# Patient Record
Sex: FEMALE | Race: WHITE
[De-identification: names, ages, dates, MRNs, and addresses within clinical notes are randomized per-mention and may not be internally consistent; named-entity substitution may affect disease eponyms.]

---

## 2020-06-12 ENCOUNTER — HOSPITAL ENCOUNTER (EMERGENCY)
Dept: HOSPITAL 87 - ER | Age: 60
Discharge: HOME | End: 2020-06-12
Payer: MEDICAID

## 2020-06-12 VITALS — SYSTOLIC BLOOD PRESSURE: 121 MMHG | DIASTOLIC BLOOD PRESSURE: 71 MMHG

## 2020-06-12 VITALS — HEIGHT: 64 IN | WEIGHT: 169.76 LBS | BODY MASS INDEX: 28.98 KG/M2

## 2020-06-12 DIAGNOSIS — I10: ICD-10-CM

## 2020-06-12 DIAGNOSIS — R11.2: Primary | ICD-10-CM

## 2020-06-12 DIAGNOSIS — Z79.899: ICD-10-CM

## 2020-06-12 LAB
BASOPHILS NFR BLD AUTO: 0.2 % (ref 0–2)
CHLORIDE SERPL-SCNC: 109 MEQ/L (ref 98–107)
EOSINOPHIL NFR BLD AUTO: 0 % (ref 0–5)
ERYTHROCYTE [DISTWIDTH] IN BLOOD BY AUTOMATED COUNT: 20.8 % (ref 11.6–14.6)
HCT VFR BLD AUTO: 44.4 % (ref 36–48)
HGB BLD-MCNC: 14.4 G/DL (ref 12–16)
LYMPHOCYTES NFR BLD AUTO: 14.3 % (ref 20–50)
MCH RBC QN AUTO: 27.3 PG (ref 28–32)
MCV RBC AUTO: 83.8 FL (ref 81–99)
MONOCYTES NFR BLD AUTO: 8.1 % (ref 2–8)
NEUTROPHILS NFR BLD AUTO: 77.4 % (ref 40–76)
PLATELET # BLD AUTO: 216 X1000/UL (ref 130–400)
PMV BLD AUTO: 8.6 FL (ref 7.4–10.4)
RBC # BLD AUTO: 5.3 MILL/UL (ref 4.2–5.4)

## 2020-06-12 PROCEDURE — 96374 THER/PROPH/DIAG INJ IV PUSH: CPT

## 2020-06-12 PROCEDURE — 80053 COMPREHEN METABOLIC PANEL: CPT

## 2020-06-12 PROCEDURE — 83690 ASSAY OF LIPASE: CPT

## 2020-06-12 PROCEDURE — 96361 HYDRATE IV INFUSION ADD-ON: CPT

## 2020-06-12 PROCEDURE — 85025 COMPLETE CBC W/AUTO DIFF WBC: CPT

## 2020-06-12 PROCEDURE — 99283 EMERGENCY DEPT VISIT LOW MDM: CPT

## 2020-06-12 PROCEDURE — 36415 COLL VENOUS BLD VENIPUNCTURE: CPT

## 2020-07-13 ENCOUNTER — HOSPITAL ENCOUNTER (EMERGENCY)
Dept: HOSPITAL 87 - ER | Age: 60
Discharge: LEFT BEFORE DISCHARGE | End: 2020-07-13
Payer: MEDICAID

## 2020-07-13 VITALS — BODY MASS INDEX: 27.48 KG/M2 | WEIGHT: 160.94 LBS | HEIGHT: 64 IN

## 2020-07-13 VITALS — DIASTOLIC BLOOD PRESSURE: 89 MMHG | SYSTOLIC BLOOD PRESSURE: 132 MMHG

## 2020-07-13 DIAGNOSIS — S00.81XA: ICD-10-CM

## 2020-07-13 DIAGNOSIS — F10.20: ICD-10-CM

## 2020-07-13 DIAGNOSIS — I10: ICD-10-CM
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
## 2020-07-13 DIAGNOSIS — R55: Primary | ICD-10-CM
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
## 2020-07-13 DIAGNOSIS — D64.9: ICD-10-CM
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
## 2020-07-13 DIAGNOSIS — Z91.81: ICD-10-CM

## 2020-07-13 DIAGNOSIS — E87.8: ICD-10-CM

## 2020-07-13 LAB
BASOPHILS NFR BLD AUTO: 0.6 % (ref 0–2)
CHLORIDE SERPL-SCNC: 112 MEQ/L (ref 98–107)
EOSINOPHIL NFR BLD AUTO: 1.4 % (ref 0–5)
ERYTHROCYTE [DISTWIDTH] IN BLOOD BY AUTOMATED COUNT: 20.9 % (ref 11.6–14.6)
ETHANOL SERPL-MCNC: < 10 MG/DL
HCT VFR BLD AUTO: 35.7 % (ref 36–48)
HGB BLD-MCNC: 11.6 G/DL (ref 12–16)
LYMPHOCYTES NFR BLD AUTO: 23.6 % (ref 20–50)
MCH RBC QN AUTO: 26.9 PG (ref 28–32)
MCV RBC AUTO: 82.9 FL (ref 81–99)
MONOCYTES NFR BLD AUTO: 10.9 % (ref 2–8)
NEUTROPHILS NFR BLD AUTO: 63.5 % (ref 40–76)
PLATELET # BLD AUTO: 239 X1000/UL (ref 130–400)
PMV BLD AUTO: 8.3 FL (ref 7.4–10.4)
RBC # BLD AUTO: 4.31 MILL/UL (ref 4.2–5.4)

## 2020-07-13 PROCEDURE — 83690 ASSAY OF LIPASE: CPT

## 2020-07-13 PROCEDURE — 36415 COLL VENOUS BLD VENIPUNCTURE: CPT

## 2020-07-13 PROCEDURE — 80320 DRUG SCREEN QUANTALCOHOLS: CPT

## 2020-07-13 PROCEDURE — G0480 DRUG TEST DEF 1-7 CLASSES: HCPCS

## 2020-07-13 PROCEDURE — 96360 HYDRATION IV INFUSION INIT: CPT

## 2020-07-13 PROCEDURE — 96361 HYDRATE IV INFUSION ADD-ON: CPT

## 2020-07-13 PROCEDURE — 70450 CT HEAD/BRAIN W/O DYE: CPT

## 2020-07-13 PROCEDURE — 84484 ASSAY OF TROPONIN QUANT: CPT

## 2020-07-13 PROCEDURE — 93005 ELECTROCARDIOGRAM TRACING: CPT

## 2020-07-13 PROCEDURE — 85025 COMPLETE CBC W/AUTO DIFF WBC: CPT

## 2020-07-13 PROCEDURE — 80053 COMPREHEN METABOLIC PANEL: CPT

## 2020-07-13 PROCEDURE — 99285 EMERGENCY DEPT VISIT HI MDM: CPT

## 2020-07-13 PROCEDURE — 83880 ASSAY OF NATRIURETIC PEPTIDE: CPT

## 2020-07-13 PROCEDURE — 71045 X-RAY EXAM CHEST 1 VIEW: CPT

## 2020-10-12 ENCOUNTER — HOSPITAL ENCOUNTER (EMERGENCY)
Dept: HOSPITAL 87 - ER | Age: 60
Discharge: HOME | End: 2020-10-12
Payer: MEDICAID

## 2020-10-12 VITALS — BODY MASS INDEX: 27.28 KG/M2 | HEIGHT: 66 IN | WEIGHT: 169.76 LBS

## 2020-10-12 VITALS — DIASTOLIC BLOOD PRESSURE: 82 MMHG | SYSTOLIC BLOOD PRESSURE: 127 MMHG

## 2020-10-12 DIAGNOSIS — L03.011: Primary | ICD-10-CM

## 2020-10-12 DIAGNOSIS — J45.909: ICD-10-CM

## 2020-10-12 DIAGNOSIS — Z86.14: ICD-10-CM

## 2020-10-12 DIAGNOSIS — I10: ICD-10-CM

## 2020-10-12 PROCEDURE — 99283 EMERGENCY DEPT VISIT LOW MDM: CPT

## 2020-10-12 PROCEDURE — 82962 GLUCOSE BLOOD TEST: CPT

## 2020-10-12 PROCEDURE — 10060 I&D ABSCESS SIMPLE/SINGLE: CPT

## 2020-10-12 PROCEDURE — 93005 ELECTROCARDIOGRAM TRACING: CPT

## 2022-05-26 ENCOUNTER — HOSPITAL ENCOUNTER (EMERGENCY)
Dept: HOSPITAL 87 - ER | Age: 62
Discharge: HOME | End: 2022-05-26
Payer: COMMERCIAL

## 2022-05-26 VITALS — BODY MASS INDEX: 24.34 KG/M2 | HEIGHT: 62 IN | WEIGHT: 132.28 LBS

## 2022-05-26 VITALS — SYSTOLIC BLOOD PRESSURE: 116 MMHG | DIASTOLIC BLOOD PRESSURE: 87 MMHG

## 2022-05-26 DIAGNOSIS — I10: ICD-10-CM

## 2022-05-26 DIAGNOSIS — L03.116: Primary | ICD-10-CM

## 2022-05-26 DIAGNOSIS — Z86.14: ICD-10-CM

## 2022-05-26 DIAGNOSIS — Z87.01: ICD-10-CM

## 2022-05-26 DIAGNOSIS — J40: ICD-10-CM

## 2022-05-26 LAB
BASOPHILS NFR BLD AUTO: 0.8 % (ref 0–2)
CHLORIDE SERPL-SCNC: 115 MEQ/L (ref 98–107)
EOSINOPHIL NFR BLD AUTO: 2.3 % (ref 0–5)
ERYTHROCYTE [DISTWIDTH] IN BLOOD BY AUTOMATED COUNT: 17.6 % (ref 11.6–14.6)
HCT VFR BLD AUTO: 41.3 % (ref 36–48)
HGB BLD-MCNC: 13.2 G/DL (ref 12–16)
LYMPHOCYTES NFR BLD AUTO: 31.2 % (ref 20–50)
MCH RBC QN AUTO: 31.4 PG (ref 28–32)
MCV RBC AUTO: 98.4 FL (ref 81–99)
MONOCYTES NFR BLD AUTO: 14.3 % (ref 2–8)
NEUTROPHILS NFR BLD AUTO: 51.4 % (ref 40–76)
PLATELET # BLD AUTO: 177 X1000/UL (ref 130–400)
PMV BLD AUTO: 7.8 FL (ref 7.4–10.4)
RBC # BLD AUTO: 4.19 MILL/UL (ref 4.2–5.4)

## 2022-05-26 PROCEDURE — 36415 COLL VENOUS BLD VENIPUNCTURE: CPT

## 2022-05-26 PROCEDURE — 85025 COMPLETE CBC W/AUTO DIFF WBC: CPT

## 2022-05-26 PROCEDURE — 99283 EMERGENCY DEPT VISIT LOW MDM: CPT

## 2022-05-26 PROCEDURE — 80053 COMPREHEN METABOLIC PANEL: CPT

## 2022-10-06 ENCOUNTER — HOSPITAL ENCOUNTER (EMERGENCY)
Dept: HOSPITAL 87 - ER | Age: 62
Discharge: HOME | End: 2022-10-06
Payer: COMMERCIAL

## 2022-10-06 VITALS — BODY MASS INDEX: 18.37 KG/M2 | HEIGHT: 65 IN | WEIGHT: 110.23 LBS

## 2022-10-06 VITALS — DIASTOLIC BLOOD PRESSURE: 68 MMHG | SYSTOLIC BLOOD PRESSURE: 114 MMHG
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
## 2022-10-06 DIAGNOSIS — J45.909: ICD-10-CM

## 2022-10-06 DIAGNOSIS — S80.01XA: ICD-10-CM

## 2022-10-06 DIAGNOSIS — Z71.6: ICD-10-CM

## 2022-10-06 DIAGNOSIS — Z86.14: ICD-10-CM

## 2022-10-06 DIAGNOSIS — S61.512A: ICD-10-CM

## 2022-10-06 DIAGNOSIS — S01.81XA: Primary | ICD-10-CM

## 2022-10-06 DIAGNOSIS — F17.210: ICD-10-CM
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
## 2022-10-06 DIAGNOSIS — S09.8XXA: ICD-10-CM
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
## 2022-10-06 DIAGNOSIS — Z87.01: ICD-10-CM

## 2022-10-06 DIAGNOSIS — I10: ICD-10-CM

## 2022-10-06 DIAGNOSIS — S61.511A: ICD-10-CM

## 2022-10-06 PROCEDURE — 90715 TDAP VACCINE 7 YRS/> IM: CPT

## 2022-10-06 PROCEDURE — 99284 EMERGENCY DEPT VISIT MOD MDM: CPT

## 2022-10-06 PROCEDURE — 73560 X-RAY EXAM OF KNEE 1 OR 2: CPT

## 2022-10-06 PROCEDURE — 73100 X-RAY EXAM OF WRIST: CPT

## 2022-12-06 ENCOUNTER — HOSPITAL ENCOUNTER (EMERGENCY)
Dept: HOSPITAL 87 - ER | Age: 62
LOS: 2 days | Discharge: HOME | End: 2022-12-08
Payer: COMMERCIAL

## 2022-12-06 VITALS — WEIGHT: 94.8 LBS | HEIGHT: 66 IN | BODY MASS INDEX: 15.24 KG/M2

## 2022-12-06 DIAGNOSIS — R10.9: Primary | ICD-10-CM

## 2022-12-06 LAB
APPEARANCE UR: (no result)
BASOPHILS NFR BLD AUTO: 0.5 % (ref 0–2)
CHLORIDE SERPL-SCNC: 110 MEQ/L (ref 98–107)
COLOR UR: YELLOW
EOSINOPHIL NFR BLD AUTO: 0.3 % (ref 0–5)
ERYTHROCYTE [DISTWIDTH] IN BLOOD BY AUTOMATED COUNT: 16.3 % (ref 11.6–14.6)
HCT VFR BLD AUTO: 40 % (ref 36–48)
HGB BLD-MCNC: 13.2 G/DL (ref 12–16)
HGB UR QL STRIP: NEGATIVE
INR PPP: 1.1
KETONES UR STRIP-MCNC: NEGATIVE MG/DL
LEUKOCYTE ESTERASE UR QL STRIP: NEGATIVE
LYMPHOCYTES NFR BLD AUTO: 33.9 % (ref 20–50)
MCH RBC QN AUTO: 31.2 PG (ref 28–32)
MCV RBC AUTO: 94.5 FL (ref 81–99)
MONOCYTES NFR BLD AUTO: 9.3 % (ref 2–8)
NEUTROPHILS NFR BLD AUTO: 56 % (ref 40–76)
NITRITE UR QL STRIP: NEGATIVE
PH UR STRIP: 5.5 [PH] (ref 4.5–8)
PLATELET # BLD AUTO: 250 X1000/UL (ref 130–400)
PMV BLD AUTO: 7.6 FL (ref 7.4–10.4)
PROT UR QL STRIP: (no result)
PROTHROMBIN TIME: 12.2 SEC (ref 9.6–11)
RBC # BLD AUTO: 4.23 MILL/UL (ref 4.2–5.4)
SP GR UR STRIP: 1.03 (ref 1–1.03)
UROBILINOGEN UR STRIP-MCNC: 1 E.U./DL (ref 0.2–1)

## 2022-12-06 PROCEDURE — 96374 THER/PROPH/DIAG INJ IV PUSH: CPT

## 2022-12-06 PROCEDURE — 99285 EMERGENCY DEPT VISIT HI MDM: CPT

## 2022-12-06 PROCEDURE — 36415 COLL VENOUS BLD VENIPUNCTURE: CPT

## 2022-12-06 PROCEDURE — 71045 X-RAY EXAM CHEST 1 VIEW: CPT

## 2022-12-06 PROCEDURE — 93005 ELECTROCARDIOGRAM TRACING: CPT

## 2022-12-06 PROCEDURE — 84484 ASSAY OF TROPONIN QUANT: CPT

## 2022-12-06 PROCEDURE — 85610 PROTHROMBIN TIME: CPT

## 2022-12-06 PROCEDURE — 96375 TX/PRO/DX INJ NEW DRUG ADDON: CPT

## 2022-12-06 PROCEDURE — 81003 URINALYSIS AUTO W/O SCOPE: CPT

## 2022-12-06 PROCEDURE — 85025 COMPLETE CBC W/AUTO DIFF WBC: CPT

## 2022-12-06 PROCEDURE — 96361 HYDRATE IV INFUSION ADD-ON: CPT

## 2022-12-06 PROCEDURE — 74176 CT ABD & PELVIS W/O CONTRAST: CPT

## 2022-12-06 PROCEDURE — 80053 COMPREHEN METABOLIC PANEL: CPT

## 2022-12-07 RX ADMIN — SERTRALINE HYDROCHLORIDE SCH MG: 25 TABLET ORAL at 09:00

## 2022-12-08 ENCOUNTER — HOSPITAL ENCOUNTER (EMERGENCY)
Dept: HOSPITAL 87 - ER | Age: 62
LOS: 1 days | Discharge: HOME | End: 2022-12-09
Payer: COMMERCIAL

## 2022-12-08 VITALS — HEIGHT: 66 IN | BODY MASS INDEX: 21.26 KG/M2 | WEIGHT: 132.28 LBS

## 2022-12-08 VITALS — SYSTOLIC BLOOD PRESSURE: 96 MMHG | DIASTOLIC BLOOD PRESSURE: 69 MMHG

## 2022-12-08 VITALS — SYSTOLIC BLOOD PRESSURE: 106 MMHG | DIASTOLIC BLOOD PRESSURE: 79 MMHG

## 2022-12-08 DIAGNOSIS — F17.290: ICD-10-CM

## 2022-12-08 DIAGNOSIS — J40: ICD-10-CM

## 2022-12-08 DIAGNOSIS — E05.90: ICD-10-CM

## 2022-12-08 DIAGNOSIS — R10.13: Primary | ICD-10-CM

## 2022-12-08 DIAGNOSIS — Z87.01: ICD-10-CM

## 2022-12-08 LAB
BASOPHILS NFR BLD AUTO: 0.4 % (ref 0–2)
CHLORIDE SERPL-SCNC: 106 MEQ/L (ref 98–107)
EOSINOPHIL NFR BLD AUTO: 0.6 % (ref 0–5)
ERYTHROCYTE [DISTWIDTH] IN BLOOD BY AUTOMATED COUNT: 15.8 % (ref 11.6–14.6)
HCT VFR BLD AUTO: 38.8 % (ref 36–48)
HGB BLD-MCNC: 12.7 G/DL (ref 12–16)
LYMPHOCYTES NFR BLD AUTO: 37.8 % (ref 20–50)
MCH RBC QN AUTO: 31.3 PG (ref 28–32)
MCV RBC AUTO: 95.4 FL (ref 81–99)
MONOCYTES NFR BLD AUTO: 7.5 % (ref 2–8)
NEUTROPHILS NFR BLD AUTO: 53.7 % (ref 40–76)
PLATELET # BLD AUTO: 228 X1000/UL (ref 130–400)
PMV BLD AUTO: 8 FL (ref 7.4–10.4)
RBC # BLD AUTO: 4.07 MILL/UL (ref 4.2–5.4)

## 2022-12-08 PROCEDURE — 99284 EMERGENCY DEPT VISIT MOD MDM: CPT

## 2022-12-08 PROCEDURE — 85025 COMPLETE CBC W/AUTO DIFF WBC: CPT

## 2022-12-08 PROCEDURE — 80053 COMPREHEN METABOLIC PANEL: CPT

## 2022-12-08 PROCEDURE — 74176 CT ABD & PELVIS W/O CONTRAST: CPT

## 2022-12-08 PROCEDURE — 36415 COLL VENOUS BLD VENIPUNCTURE: CPT

## 2022-12-08 RX ADMIN — SERTRALINE HYDROCHLORIDE SCH MG: 25 TABLET ORAL at 09:00

## 2022-12-09 ENCOUNTER — HOSPITAL ENCOUNTER (EMERGENCY)
Dept: HOSPITAL 87 - ER | Age: 62
Discharge: ANOTHER HOSPITAL | End: 2022-12-09
Payer: COMMERCIAL

## 2022-12-09 VITALS — DIASTOLIC BLOOD PRESSURE: 70 MMHG | SYSTOLIC BLOOD PRESSURE: 109 MMHG

## 2022-12-09 VITALS — BODY MASS INDEX: 15.94 KG/M2 | WEIGHT: 99.21 LBS | HEIGHT: 66 IN

## 2022-12-09 DIAGNOSIS — F32.9: ICD-10-CM

## 2022-12-09 DIAGNOSIS — E05.90: ICD-10-CM

## 2022-12-09 DIAGNOSIS — Z79.899: ICD-10-CM

## 2022-12-09 DIAGNOSIS — Z87.01: ICD-10-CM

## 2022-12-09 DIAGNOSIS — Z20.822: ICD-10-CM

## 2022-12-09 DIAGNOSIS — N39.0: ICD-10-CM

## 2022-12-09 DIAGNOSIS — J45.909: ICD-10-CM

## 2022-12-09 DIAGNOSIS — F17.290: ICD-10-CM

## 2022-12-09 DIAGNOSIS — R45.851: Primary | ICD-10-CM

## 2022-12-09 LAB
AMPHETAMINES UR QL SCN: NEGATIVE
APPEARANCE UR: CLEAR
BARBITURATES UR QL SCN: NEGATIVE
BASOPHILS NFR BLD AUTO: 0.6 % (ref 0–2)
BENZODIAZ UR QL SCN: NEGATIVE
BZE UR QL SCN: NEGATIVE
CANNABINOIDS UR QL SCN: NEGATIVE
CHLORIDE SERPL-SCNC: 109 MEQ/L (ref 98–107)
COLOR UR: YELLOW
EOSINOPHIL NFR BLD AUTO: 0.9 % (ref 0–5)
ERYTHROCYTE [DISTWIDTH] IN BLOOD BY AUTOMATED COUNT: 15.8 % (ref 11.6–14.6)
ETHANOL SERPL-MCNC: < 10 MG/DL
HCT VFR BLD AUTO: 40.5 % (ref 36–48)
HGB BLD-MCNC: 13.2 G/DL (ref 12–16)
HGB UR QL STRIP: (no result)
KETONES UR STRIP-MCNC: (no result) MG/DL
LEUKOCYTE ESTERASE UR QL STRIP: (no result)
LYMPHOCYTES NFR BLD AUTO: 45 % (ref 20–50)
MCH RBC QN AUTO: 31.1 PG (ref 28–32)
MCV RBC AUTO: 95.5 FL (ref 81–99)
METHADONE UR QL SCN: (no result)
MONOCYTES NFR BLD AUTO: 8.2 % (ref 2–8)
NEUTROPHILS NFR BLD AUTO: 45.3 % (ref 40–76)
NITRITE UR QL STRIP: NEGATIVE
OPIATES UR QL SCN: NEGATIVE
PCP UR QL SCN: NEGATIVE
PH UR STRIP: 6 [PH] (ref 4.5–8)
PLATELET # BLD AUTO: 219 X1000/UL (ref 130–400)
PMV BLD AUTO: 8 FL (ref 7.4–10.4)
PROT UR QL STRIP: NEGATIVE
RBC # BLD AUTO: 4.24 MILL/UL (ref 4.2–5.4)
SP GR UR STRIP: 1.02 (ref 1–1.03)
UROBILINOGEN UR STRIP-MCNC: 1 E.U./DL (ref 0.2–1)

## 2022-12-09 PROCEDURE — U0003 INFECTIOUS AGENT DETECTION BY NUCLEIC ACID (DNA OR RNA); SEVERE ACUTE RESPIRATORY SYNDROME CORONAVIRUS 2 (SARS-COV-2) (CORONAVIRUS DISEASE [COVID-19]), AMPLIFIED PROBE TECHNIQUE, MAKING USE OF HIGH THROUGHPUT TECHNOLOGIES AS DESCRIBED BY CMS-2020-01-R: HCPCS

## 2022-12-09 PROCEDURE — 80329 ANALGESICS NON-OPIOID 1 OR 2: CPT

## 2022-12-09 PROCEDURE — 80305 DRUG TEST PRSMV DIR OPT OBS: CPT

## 2022-12-09 PROCEDURE — 87426 SARSCOV CORONAVIRUS AG IA: CPT

## 2022-12-09 PROCEDURE — 85025 COMPLETE CBC W/AUTO DIFF WBC: CPT

## 2022-12-09 PROCEDURE — 80053 COMPREHEN METABOLIC PANEL: CPT

## 2022-12-09 PROCEDURE — G0480 DRUG TEST DEF 1-7 CLASSES: HCPCS

## 2022-12-09 PROCEDURE — 36415 COLL VENOUS BLD VENIPUNCTURE: CPT

## 2022-12-09 PROCEDURE — 80320 DRUG SCREEN QUANTALCOHOLS: CPT

## 2022-12-09 PROCEDURE — 99285 EMERGENCY DEPT VISIT HI MDM: CPT

## 2022-12-09 PROCEDURE — 81003 URINALYSIS AUTO W/O SCOPE: CPT

## 2022-12-09 PROCEDURE — C9803 HOPD COVID-19 SPEC COLLECT: HCPCS

## 2022-12-09 PROCEDURE — 80307 DRUG TEST PRSMV CHEM ANLYZR: CPT

## 2022-12-09 RX ADMIN — CEPHALEXIN SCH MG: 250 CAPSULE ORAL at 09:24

## 2022-12-09 RX ADMIN — CEPHALEXIN SCH MG: 250 CAPSULE ORAL at 12:46

## 2022-12-27 ENCOUNTER — HOSPITAL ENCOUNTER (EMERGENCY)
Dept: HOSPITAL 87 - ER | Age: 62
Discharge: LEFT BEFORE DISCHARGE | End: 2022-12-27
Payer: COMMERCIAL

## 2022-12-27 VITALS — BODY MASS INDEX: 22.68 KG/M2 | WEIGHT: 141.1 LBS | HEIGHT: 66 IN

## 2022-12-27 VITALS — DIASTOLIC BLOOD PRESSURE: 72 MMHG | SYSTOLIC BLOOD PRESSURE: 122 MMHG

## 2022-12-27 DIAGNOSIS — Z53.21: Primary | ICD-10-CM

## 2023-01-06 ENCOUNTER — HOSPITAL ENCOUNTER (EMERGENCY)
Dept: HOSPITAL 87 - ER | Age: 63
Discharge: HOME | End: 2023-01-06
Payer: COMMERCIAL

## 2023-01-06 VITALS — DIASTOLIC BLOOD PRESSURE: 80 MMHG | SYSTOLIC BLOOD PRESSURE: 134 MMHG

## 2023-01-06 VITALS — HEIGHT: 63 IN | WEIGHT: 130.07 LBS | BODY MASS INDEX: 23.05 KG/M2

## 2023-01-06 DIAGNOSIS — J45.909: ICD-10-CM

## 2023-01-06 DIAGNOSIS — M54.9: Primary | ICD-10-CM

## 2023-01-06 DIAGNOSIS — Z86.39: ICD-10-CM

## 2023-01-06 PROCEDURE — 99284 EMERGENCY DEPT VISIT MOD MDM: CPT

## 2023-01-06 PROCEDURE — 96372 THER/PROPH/DIAG INJ SC/IM: CPT

## 2023-01-06 PROCEDURE — 72100 X-RAY EXAM L-S SPINE 2/3 VWS: CPT

## 2023-01-06 PROCEDURE — 72070 X-RAY EXAM THORAC SPINE 2VWS: CPT

## 2023-03-03 ENCOUNTER — HOSPITAL ENCOUNTER (INPATIENT)
Dept: HOSPITAL 87 - ER | Age: 63
LOS: 2 days | Discharge: LEFT BEFORE DISCHARGE | DRG: 243 | End: 2023-03-05
Attending: INTERNAL MEDICINE | Admitting: INTERNAL MEDICINE
Payer: COMMERCIAL

## 2023-03-03 VITALS — WEIGHT: 114 LBS | HEIGHT: 66 IN | BODY MASS INDEX: 18.32 KG/M2

## 2023-03-03 DIAGNOSIS — E03.9: ICD-10-CM

## 2023-03-03 DIAGNOSIS — M19.90: ICD-10-CM

## 2023-03-03 DIAGNOSIS — C44.90: ICD-10-CM

## 2023-03-03 DIAGNOSIS — G43.909: ICD-10-CM

## 2023-03-03 DIAGNOSIS — E05.90: ICD-10-CM

## 2023-03-03 DIAGNOSIS — F32.A: ICD-10-CM

## 2023-03-03 DIAGNOSIS — F17.210: ICD-10-CM

## 2023-03-03 DIAGNOSIS — Z82.49: ICD-10-CM

## 2023-03-03 DIAGNOSIS — R07.89: ICD-10-CM

## 2023-03-03 DIAGNOSIS — K21.9: Primary | ICD-10-CM

## 2023-03-03 DIAGNOSIS — F41.0: ICD-10-CM

## 2023-03-03 DIAGNOSIS — Z53.29: ICD-10-CM

## 2023-03-03 DIAGNOSIS — J44.9: ICD-10-CM

## 2023-03-03 DIAGNOSIS — Z79.899: ICD-10-CM

## 2023-03-03 LAB
BASOPHILS NFR BLD AUTO: 0.6 % (ref 0–2)
CHLORIDE SERPL-SCNC: 110 MEQ/L (ref 98–107)
EOSINOPHIL NFR BLD AUTO: 0.9 % (ref 0–5)
ERYTHROCYTE [DISTWIDTH] IN BLOOD BY AUTOMATED COUNT: 18.1 % (ref 11.6–14.6)
HCT VFR BLD AUTO: 39.5 % (ref 36–48)
HGB BLD-MCNC: 12.6 G/DL (ref 12–16)
LYMPHOCYTES NFR BLD AUTO: 28.5 % (ref 20–50)
MCH RBC QN AUTO: 28.8 PG (ref 28–32)
MCV RBC AUTO: 90.2 FL (ref 81–99)
MONOCYTES NFR BLD AUTO: 12.3 % (ref 2–8)
NEUTROPHILS NFR BLD AUTO: 57.7 % (ref 40–76)
PLATELET # BLD AUTO: 295 X1000/UL (ref 130–400)
PMV BLD AUTO: 7.3 FL (ref 7.4–10.4)
RBC # BLD AUTO: 4.39 MILL/UL (ref 4.2–5.4)

## 2023-03-03 PROCEDURE — 83880 ASSAY OF NATRIURETIC PEPTIDE: CPT

## 2023-03-03 PROCEDURE — 85025 COMPLETE CBC W/AUTO DIFF WBC: CPT

## 2023-03-03 PROCEDURE — 99285 EMERGENCY DEPT VISIT HI MDM: CPT

## 2023-03-03 PROCEDURE — 90732 PPSV23 VACC 2 YRS+ SUBQ/IM: CPT

## 2023-03-03 PROCEDURE — 80053 COMPREHEN METABOLIC PANEL: CPT

## 2023-03-03 PROCEDURE — 93005 ELECTROCARDIOGRAM TRACING: CPT

## 2023-03-03 PROCEDURE — 80061 LIPID PANEL: CPT

## 2023-03-03 PROCEDURE — 84484 ASSAY OF TROPONIN QUANT: CPT

## 2023-03-03 PROCEDURE — 71045 X-RAY EXAM CHEST 1 VIEW: CPT

## 2023-03-03 PROCEDURE — 36415 COLL VENOUS BLD VENIPUNCTURE: CPT

## 2023-03-04 VITALS — DIASTOLIC BLOOD PRESSURE: 51 MMHG | SYSTOLIC BLOOD PRESSURE: 105 MMHG

## 2023-03-04 VITALS — DIASTOLIC BLOOD PRESSURE: 78 MMHG | SYSTOLIC BLOOD PRESSURE: 125 MMHG

## 2023-03-04 VITALS — SYSTOLIC BLOOD PRESSURE: 130 MMHG | DIASTOLIC BLOOD PRESSURE: 83 MMHG

## 2023-03-04 VITALS — DIASTOLIC BLOOD PRESSURE: 62 MMHG | SYSTOLIC BLOOD PRESSURE: 112 MMHG

## 2023-03-04 VITALS — SYSTOLIC BLOOD PRESSURE: 105 MMHG | DIASTOLIC BLOOD PRESSURE: 67 MMHG

## 2023-03-04 VITALS — SYSTOLIC BLOOD PRESSURE: 94 MMHG | DIASTOLIC BLOOD PRESSURE: 59 MMHG

## 2023-03-04 LAB
HDLC SERPL-MCNC: 63 MG/DL (ref 40–59)
LDLC SERPL DIRECT ASSAY-MCNC: 103 MG/DL (ref 5–100)

## 2023-03-04 RX ADMIN — FAMOTIDINE SCH MG: 20 TABLET ORAL at 08:53

## 2023-03-04 RX ADMIN — HYDROCODONE BITARTRATE AND ACETAMINOPHEN PRN TAB: 5; 325 TABLET ORAL at 15:26

## 2023-03-04 RX ADMIN — METOPROLOL TARTRATE SCH MG: 25 TABLET ORAL at 21:13

## 2023-03-04 RX ADMIN — HYDROCODONE BITARTRATE AND ACETAMINOPHEN PRN TAB: 5; 325 TABLET ORAL at 06:14

## 2023-03-04 RX ADMIN — NICOTINE SCH MG: 14 PATCH, EXTENDED RELEASE TRANSDERMAL at 08:54

## 2023-03-04 RX ADMIN — FAMOTIDINE SCH MG: 20 TABLET ORAL at 16:47

## 2023-03-04 RX ADMIN — ISOSORBIDE MONONITRATE SCH MG: 30 TABLET, EXTENDED RELEASE ORAL at 08:53

## 2023-03-04 RX ADMIN — METOPROLOL TARTRATE SCH MG: 25 TABLET ORAL at 08:53

## 2023-03-04 RX ADMIN — ASPIRIN SCH MG: 81 TABLET, CHEWABLE ORAL at 08:53

## 2023-03-05 VITALS — DIASTOLIC BLOOD PRESSURE: 63 MMHG | SYSTOLIC BLOOD PRESSURE: 113 MMHG

## 2023-03-05 VITALS — SYSTOLIC BLOOD PRESSURE: 122 MMHG | DIASTOLIC BLOOD PRESSURE: 78 MMHG

## 2023-03-05 VITALS — DIASTOLIC BLOOD PRESSURE: 61 MMHG | SYSTOLIC BLOOD PRESSURE: 111 MMHG

## 2023-03-05 VITALS — SYSTOLIC BLOOD PRESSURE: 127 MMHG | DIASTOLIC BLOOD PRESSURE: 68 MMHG

## 2023-03-05 LAB
BASOPHILS NFR BLD AUTO: 0.7 % (ref 0–2)
EOSINOPHIL NFR BLD AUTO: 1.7 % (ref 0–5)
ERYTHROCYTE [DISTWIDTH] IN BLOOD BY AUTOMATED COUNT: 17.7 % (ref 11.6–14.6)
HCT VFR BLD AUTO: 34.7 % (ref 36–48)
HGB BLD-MCNC: 11.1 G/DL (ref 12–16)
LYMPHOCYTES NFR BLD AUTO: 22.5 % (ref 20–50)
MCH RBC QN AUTO: 28.5 PG (ref 28–32)
MCV RBC AUTO: 88.9 FL (ref 81–99)
MONOCYTES NFR BLD AUTO: 13.8 % (ref 2–8)
NEUTROPHILS NFR BLD AUTO: 61.3 % (ref 40–76)
PLATELET # BLD AUTO: 247 X1000/UL (ref 130–400)
PMV BLD AUTO: 7.5 FL (ref 7.4–10.4)
RBC # BLD AUTO: 3.9 MILL/UL (ref 4.2–5.4)

## 2023-03-05 RX ADMIN — HYDROCODONE BITARTRATE AND ACETAMINOPHEN PRN TAB: 5; 325 TABLET ORAL at 06:02

## 2023-03-05 RX ADMIN — FAMOTIDINE SCH MG: 20 TABLET ORAL at 08:39

## 2023-03-05 RX ADMIN — METOPROLOL TARTRATE SCH MG: 25 TABLET ORAL at 08:40

## 2023-03-05 RX ADMIN — ISOSORBIDE MONONITRATE SCH MG: 30 TABLET, EXTENDED RELEASE ORAL at 08:39

## 2023-03-05 RX ADMIN — HYDROCODONE BITARTRATE AND ACETAMINOPHEN PRN TAB: 5; 325 TABLET ORAL at 10:36

## 2023-03-05 RX ADMIN — HYDROCODONE BITARTRATE AND ACETAMINOPHEN PRN TAB: 5; 325 TABLET ORAL at 01:04

## 2023-03-05 RX ADMIN — NICOTINE SCH MG: 14 PATCH, EXTENDED RELEASE TRANSDERMAL at 08:40

## 2023-03-05 RX ADMIN — ASPIRIN SCH MG: 81 TABLET, CHEWABLE ORAL at 08:39

## 2023-05-21 ENCOUNTER — HOSPITAL ENCOUNTER (EMERGENCY)
Dept: HOSPITAL 12 - ER | Age: 63
Discharge: HOME | End: 2023-05-21
Payer: COMMERCIAL

## 2023-05-21 VITALS — SYSTOLIC BLOOD PRESSURE: 130 MMHG | DIASTOLIC BLOOD PRESSURE: 68 MMHG

## 2023-05-21 VITALS — HEIGHT: 65 IN | BODY MASS INDEX: 19.99 KG/M2 | WEIGHT: 120 LBS

## 2023-05-21 DIAGNOSIS — F17.210: ICD-10-CM

## 2023-05-21 DIAGNOSIS — Z79.899: ICD-10-CM

## 2023-05-21 DIAGNOSIS — M54.9: ICD-10-CM

## 2023-05-21 DIAGNOSIS — R11.10: ICD-10-CM

## 2023-05-21 DIAGNOSIS — R07.89: ICD-10-CM

## 2023-05-21 DIAGNOSIS — R42: Primary | ICD-10-CM

## 2023-05-21 DIAGNOSIS — J45.909: ICD-10-CM

## 2023-05-21 LAB
ALP SERPL-CCNC: 120 U/L (ref 50–136)
ALT SERPL W/O P-5'-P-CCNC: 19 U/L (ref 14–59)
APPEARANCE UR: CLEAR
AST SERPL-CCNC: 10 U/L (ref 15–37)
BILIRUB DIRECT SERPL-MCNC: 0.1 MG/DL (ref 0–0.2)
BILIRUB SERPL-MCNC: 0.2 MG/DL (ref 0.2–1)
BILIRUB UR QL STRIP: NEGATIVE
BUN SERPL-MCNC: 17 MG/DL (ref 7–18)
BUN SERPL-MCNC: 18 MG/DL (ref 7–18)
CHLORIDE SERPL-SCNC: 109 MMOL/L (ref 98–107)
CHLORIDE SERPL-SCNC: 114 MMOL/L (ref 98–107)
CO2 SERPL-SCNC: 23 MMOL/L (ref 21–32)
CO2 SERPL-SCNC: 23 MMOL/L (ref 21–32)
COLOR UR: YELLOW
CREAT SERPL-MCNC: 0.6 MG/DL (ref 0.6–1.3)
CREAT SERPL-MCNC: 0.6 MG/DL (ref 0.6–1.3)
GLUCOSE SERPL-MCNC: 100 MG/DL (ref 74–106)
GLUCOSE SERPL-MCNC: 84 MG/DL (ref 74–106)
GLUCOSE UR STRIP-MCNC: NEGATIVE MG/DL
HCT VFR BLD AUTO: 33.8 % (ref 31.2–41.9)
HGB UR QL STRIP: NEGATIVE
KETONES UR STRIP-MCNC: NEGATIVE MG/DL
LEUKOCYTE ESTERASE UR QL STRIP: NEGATIVE
LIPASE SERPL-CCNC: 31 U/L (ref 73–393)
MCH RBC QN AUTO: 27.6 UUG (ref 24.7–32.8)
MCV RBC AUTO: 86.5 FL (ref 75.5–95.3)
NITRITE UR QL STRIP: NEGATIVE
PH UR STRIP: 6 [PH] (ref 5–8)
PLATELET # BLD AUTO: 288 K/UL (ref 179–408)
POTASSIUM SERPL-SCNC: 3.9 MMOL/L (ref 3.5–5.1)
POTASSIUM SERPL-SCNC: 4.1 MMOL/L (ref 3.5–5.1)
SP GR UR STRIP: >=1.03 (ref 1–1.03)
UROBILINOGEN UR STRIP-MCNC: 0.2 E.U./DL
WS STN SPEC: 5.9 G/DL (ref 6.4–8.2)

## 2023-05-21 PROCEDURE — A4663 DIALYSIS BLOOD PRESSURE CUFF: HCPCS

## 2023-06-01 ENCOUNTER — HOSPITAL ENCOUNTER (EMERGENCY)
Dept: HOSPITAL 54 - ER | Age: 63
Discharge: HOME | End: 2023-06-01
Payer: MEDICAID

## 2023-06-01 VITALS — DIASTOLIC BLOOD PRESSURE: 95 MMHG | SYSTOLIC BLOOD PRESSURE: 143 MMHG

## 2023-06-01 VITALS — BODY MASS INDEX: 19.29 KG/M2 | WEIGHT: 120 LBS | HEIGHT: 66 IN

## 2023-06-01 DIAGNOSIS — F41.9: ICD-10-CM

## 2023-06-01 DIAGNOSIS — F32.A: ICD-10-CM

## 2023-06-01 DIAGNOSIS — Z60.2: ICD-10-CM

## 2023-06-01 DIAGNOSIS — M54.6: Primary | ICD-10-CM

## 2023-06-01 SDOH — SOCIAL STABILITY - SOCIAL INSECURITY: PROBLEMS RELATED TO LIVING ALONE: Z60.2

## 2023-06-01 NOTE — NUR
BIBS C/O CHRONIC BACK AND STATING THAT DUE TO THE BACK PAIN SHE HAD BEEN 
FEELING DEPRESSED AND ANXIOUS. PT DENIES SUICIDAL IDEATION. VITALS ARE WITHIN 
NORMAL LIMITS. AWAITING MD MUNOZ.

## 2023-06-22 ENCOUNTER — HOSPITAL ENCOUNTER (EMERGENCY)
Dept: HOSPITAL 26 - MED | Age: 63
LOS: 1 days | Discharge: HOME | End: 2023-06-23
Payer: MEDICAID

## 2023-06-22 VITALS
DIASTOLIC BLOOD PRESSURE: 75 MMHG | HEART RATE: 80 BPM | RESPIRATION RATE: 17 BRPM | TEMPERATURE: 97.8 F | SYSTOLIC BLOOD PRESSURE: 140 MMHG

## 2023-06-22 VITALS — BODY MASS INDEX: 19.29 KG/M2 | WEIGHT: 120 LBS | HEIGHT: 66 IN

## 2023-06-22 VITALS
DIASTOLIC BLOOD PRESSURE: 75 MMHG | RESPIRATION RATE: 17 BRPM | SYSTOLIC BLOOD PRESSURE: 140 MMHG | TEMPERATURE: 97.8 F | HEART RATE: 80 BPM

## 2023-06-22 DIAGNOSIS — M54.6: ICD-10-CM

## 2023-06-22 DIAGNOSIS — F32.9: ICD-10-CM

## 2023-06-22 DIAGNOSIS — R45.851: ICD-10-CM

## 2023-06-22 DIAGNOSIS — Z79.899: ICD-10-CM

## 2023-06-22 DIAGNOSIS — Z79.1: ICD-10-CM

## 2023-06-22 DIAGNOSIS — Z98.890: ICD-10-CM

## 2023-06-22 DIAGNOSIS — G89.29: Primary | ICD-10-CM

## 2023-06-22 PROCEDURE — G0480 DRUG TEST DEF 1-7 CLASSES: HCPCS

## 2023-06-22 PROCEDURE — 85025 COMPLETE CBC W/AUTO DIFF WBC: CPT

## 2023-06-22 PROCEDURE — 80053 COMPREHEN METABOLIC PANEL: CPT

## 2023-06-22 PROCEDURE — 96372 THER/PROPH/DIAG INJ SC/IM: CPT

## 2023-06-22 PROCEDURE — 80048 BASIC METABOLIC PNL TOTAL CA: CPT

## 2023-06-22 PROCEDURE — 36415 COLL VENOUS BLD VENIPUNCTURE: CPT

## 2023-06-22 PROCEDURE — 80305 DRUG TEST PRSMV DIR OPT OBS: CPT

## 2023-06-22 PROCEDURE — 99285 EMERGENCY DEPT VISIT HI MDM: CPT

## 2023-06-22 PROCEDURE — G0482 DRUG TEST DEF 15-21 CLASSES: HCPCS

## 2023-06-22 NOTE — NUR
64 YO F BIB SELF C/O BACK PAIN LOCATION T9 WITH PAIN 7/10. PT STATES PAIN IS 
WORSE SINCE BACK SURGERY ON MAY 2ND. 



NKDA

## 2023-06-23 LAB
ALBUMIN FLD-MCNC: 3.6 G/DL (ref 3.4–5)
ANION GAP SERPL CALCULATED.3IONS-SCNC: 11.7 MMOL/L (ref 8–16)
ANION GAP SERPL CALCULATED.3IONS-SCNC: 12.8 MMOL/L (ref 8–16)
APAP SERPL-MCNC: 1.6 UG/ML (ref 10–30)
AST SERPL-CCNC: 21 U/L (ref 15–37)
BARBITURATES UR QL SCN: NEGATIVE NG/ML
BASOPHILS # BLD AUTO: 0 K/UL (ref 0–0.22)
BASOPHILS NFR BLD AUTO: 0.6 % (ref 0–2)
BENZODIAZ UR QL SCN: NEGATIVE NG/ML
BILIRUB SERPL-MCNC: 0.1 MG/DL (ref 0–1)
BUN SERPL-MCNC: 10 MG/DL (ref 7–18)
BUN SERPL-MCNC: 9 MG/DL (ref 7–18)
BZE UR QL SCN: NEGATIVE NG/ML
CANNABINOIDS UR QL SCN: NEGATIVE NG/ML
CHLORIDE SERPL-SCNC: 107 MMOL/L (ref 98–107)
CHLORIDE SERPL-SCNC: 114 MMOL/L (ref 98–107)
CO2 SERPL-SCNC: 22.7 MMOL/L (ref 21–32)
CO2 SERPL-SCNC: 27.2 MMOL/L (ref 21–32)
CREAT SERPL-MCNC: 0.5 MG/DL (ref 0.6–1.3)
CREAT SERPL-MCNC: 0.6 MG/DL (ref 0.6–1.3)
EOSINOPHIL # BLD AUTO: 0.1 K/UL (ref 0–0.4)
EOSINOPHIL NFR BLD AUTO: 1.1 % (ref 0–4)
ERYTHROCYTE [DISTWIDTH] IN BLOOD BY AUTOMATED COUNT: 18.2 % (ref 11.6–13.7)
GFR SERPL CREATININE-BSD FRML MDRD: 129 ML/MIN (ref 90–?)
GFR SERPL CREATININE-BSD FRML MDRD: 160 ML/MIN (ref 90–?)
GLUCOSE SERPL-MCNC: 75 MG/DL (ref 74–106)
GLUCOSE SERPL-MCNC: 90 MG/DL (ref 74–106)
HCT VFR BLD AUTO: 37.2 % (ref 36–48)
HGB BLD-MCNC: 12 G/DL (ref 12–16)
LYMPHOCYTES # BLD AUTO: 2.4 K/UL (ref 2.5–16.5)
LYMPHOCYTES NFR BLD AUTO: 32.5 % (ref 20.5–51.1)
MCH RBC QN AUTO: 28 PG (ref 27–31)
MCHC RBC AUTO-ENTMCNC: 32 G/DL (ref 33–37)
MCV RBC AUTO: 86.3 FL (ref 80–94)
MONOCYTES # BLD AUTO: 1 K/UL (ref 0.8–1)
MONOCYTES NFR BLD AUTO: 12.8 % (ref 1.7–9.3)
NEUTROPHILS # BLD AUTO: 4 K/UL (ref 1.8–7.7)
NEUTROPHILS NFR BLD AUTO: 53 % (ref 42.2–75.2)
OPIATES UR QL SCN: POSITIVE NG/ML
PCP UR QL SCN: NEGATIVE NG/ML
PLATELET # BLD AUTO: 273 K/UL (ref 140–450)
POTASSIUM SERPL-SCNC: 3 MMOL/L (ref 3.5–5.1)
POTASSIUM SERPL-SCNC: 4.4 MMOL/L (ref 3.5–5.1)
RBC # BLD AUTO: 4.31 MIL/UL (ref 4.2–5.4)
SALICYLATES SERPL-MCNC: 26.1 MG/DL (ref 2.8–20)
SODIUM SERPL-SCNC: 144 MMOL/L (ref 136–145)
SODIUM SERPL-SCNC: 144 MMOL/L (ref 136–145)
WBC # BLD AUTO: 7.5 K/UL (ref 4.8–10.8)

## 2023-06-23 NOTE — NUR
DORINA FROM [facility] CALLED FOR FOLLOW UP ON SALICYLATE. SUGGESTED REPEAT 
SALICYLATE 3-6HRS FROM LAST DRAW. DR. FRANDY CALHOUN.

## 2023-06-23 NOTE — NUR
PT MADE SI STATEMENTS " IF I DONT GET STRONGER PAIN MEDS OR FIX THIS PROBLEM I 
WILL GO HOME AND KILL MYSELF" DR READ AWARE AND IS AT BEDSIDE.  PT NO LONGER 
UP FOR DISPO. URINE SENT TO LAB AND BLOOD.  TO BE MEDICALLY CLEARED FOR 
TELEPSYCH IN AM

## 2023-06-23 NOTE — NUR
PT SPOKE WITH TELEPSYCHIATRY, PT DID NOT MEET CRITERIA FOR 5150, PTIS MEDIACLLY 
AND PCYHIATRY CLEARED READY TO BE DC HOME

## 2023-06-23 NOTE — NUR
PT SITTING UPRIGHT IN BED, PT STATED SHE DOESN'T WANT TO TALK, AOX4, NO PAIN 
NOTED. SAFETY MAINTAINED.

## 2023-06-23 NOTE — NUR
DR. WISE MEDICALLY CLEARED PT FOR PSYCH EVAL. 



SPOKE TO DR. SOTO TELEPSYCH FOR CONSULT, HE WILL RETURN CALL FOR CONSULT AROUND 
1:30-2PM

## 2024-03-29 ENCOUNTER — HOSPITAL ENCOUNTER (EMERGENCY)
Dept: HOSPITAL 87 - ER | Age: 64
Discharge: ANOTHER HOSPITAL | End: 2024-03-29
Payer: COMMERCIAL

## 2024-03-29 VITALS — HEART RATE: 95 BPM | OXYGEN SATURATION: 95 % | RESPIRATION RATE: 22 BRPM

## 2024-03-29 VITALS — WEIGHT: 165.35 LBS | HEIGHT: 66 IN | BODY MASS INDEX: 26.57 KG/M2

## 2024-03-29 VITALS
TEMPERATURE: 98 F | HEART RATE: 112 BPM | OXYGEN SATURATION: 95 % | RESPIRATION RATE: 20 BRPM | SYSTOLIC BLOOD PRESSURE: 112 MMHG | DIASTOLIC BLOOD PRESSURE: 68 MMHG

## 2024-03-29 DIAGNOSIS — F32.9: ICD-10-CM

## 2024-03-29 DIAGNOSIS — F17.200: ICD-10-CM

## 2024-03-29 DIAGNOSIS — Z20.822: ICD-10-CM

## 2024-03-29 DIAGNOSIS — J44.1: Primary | ICD-10-CM

## 2024-03-29 DIAGNOSIS — J40: ICD-10-CM

## 2024-03-29 DIAGNOSIS — Z87.01: ICD-10-CM

## 2024-03-29 LAB
ALBUMIN SERPL BCP-MCNC: 3.9 G/DL (ref 3.2–4.8)
ALT SERPL W P-5'-P-CCNC: 9 IU/L (ref 10–49)
APPEARANCE UR: CLEAR
AST SERPL W P-5'-P-CCNC: 15 IU/L (ref ?–34)
BACTERIA #/AREA URNS AUTO: (no result) /[HPF]
BASOPHILS NFR BLD AUTO: 0.4 % (ref 0–2)
BILIRUB SERPL-MCNC: < 0.2 MG/DL (ref 0.1–1)
BUN SERPL-MCNC: 12 MG/DL (ref 9–23)
CALCIUM SERPL-MCNC: 9.1 MG/DL (ref 8.7–10.4)
CARDIAC TROPONIN I PNL SERPL HS: < 4 NG/L (ref 3–34)
CHLORIDE SERPL-SCNC: 111 MEQ/L (ref 98–107)
CO2 SERPL-SCNC: 23 MEQ/L (ref 21–32)
COLOR UR: YELLOW
CREAT SERPL-MCNC: 0.5 MG/DL (ref 0.6–1)
EOSINOPHIL NFR BLD AUTO: 0.2 % (ref 0–5)
ERYTHROCYTE [DISTWIDTH] IN BLOOD BY AUTOMATED COUNT: 19.4 % (ref 11.6–14.6)
GLUCOSE SERPL-MCNC: 104 MG/DL (ref 70–105)
GLUCOSE UR STRIP.AUTO-MCNC: NEGATIVE MG/DL
HCT VFR BLD AUTO: 32.8 % (ref 36–48)
HGB BLD-MCNC: 10.4 G/DL (ref 12–16)
HGB UR QL STRIP: (no result)
KETONES UR STRIP-MCNC: (no result) MG/DL
LEUKOCYTE ESTERASE UR QL STRIP: NEGATIVE
LYMPHOCYTES NFR BLD AUTO: 9.4 % (ref 20–50)
MCH RBC QN AUTO: 25.6 PG (ref 28–32)
MCHC RBC AUTO-ENTMCNC: 31.8 G/DL (ref 31–37)
MCV RBC AUTO: 80.6 FL (ref 81–99)
MONOCYTES NFR BLD AUTO: 5.8 % (ref 2–8)
NEUTROPHILS NFR BLD AUTO: 84.2 % (ref 40–76)
NITRITE UR QL STRIP: NEGATIVE
PH UR STRIP: 6.5 [PH] (ref 4.5–8)
PLATELET # BLD AUTO: 299 X1000/UL (ref 130–400)
PMV BLD AUTO: 7.1 FL (ref 7.4–10.4)
POTASSIUM SERPL-SCNC: 3.7 MEQ/L (ref 3.5–5.1)
PROT SERPL-MCNC: 6.1 G/DL (ref 6–8.3)
PROT UR QL STRIP: NEGATIVE
RBC # BLD AUTO: 4.07 MILL/UL (ref 4.2–5.4)
RBC #/AREA URNS AUTO: (no result) /HPF (ref 0–2)
SODIUM SERPL-SCNC: 139 MEQ/L (ref 136–145)
SP GR UR STRIP: 1.02 (ref 1–1.03)
SQUAMOUS #/AREA URNS AUTO: (no result) /LPF
UROBILINOGEN UR STRIP-MCNC: 0.2 E.U./DL (ref 0.2–1)
WBC # BLD: 10 X1000/UL (ref 4.5–11)
WBC #/AREA URNS AUTO: (no result) /HPF (ref 0–2)

## 2024-03-29 PROCEDURE — 94644 CONT INHLJ TX 1ST HOUR: CPT

## 2024-03-29 PROCEDURE — 71045 X-RAY EXAM CHEST 1 VIEW: CPT

## 2024-03-29 PROCEDURE — 99285 EMERGENCY DEPT VISIT HI MDM: CPT

## 2024-03-29 PROCEDURE — 83605 ASSAY OF LACTIC ACID: CPT

## 2024-03-29 PROCEDURE — 80053 COMPREHEN METABOLIC PANEL: CPT

## 2024-03-29 PROCEDURE — 87086 URINE CULTURE/COLONY COUNT: CPT

## 2024-03-29 PROCEDURE — 96375 TX/PRO/DX INJ NEW DRUG ADDON: CPT

## 2024-03-29 PROCEDURE — 93005 ELECTROCARDIOGRAM TRACING: CPT

## 2024-03-29 PROCEDURE — 81003 URINALYSIS AUTO W/O SCOPE: CPT

## 2024-03-29 PROCEDURE — 36415 COLL VENOUS BLD VENIPUNCTURE: CPT

## 2024-03-29 PROCEDURE — 87040 BLOOD CULTURE FOR BACTERIA: CPT

## 2024-03-29 PROCEDURE — 96365 THER/PROPH/DIAG IV INF INIT: CPT

## 2024-03-29 PROCEDURE — 84484 ASSAY OF TROPONIN QUANT: CPT

## 2024-03-29 PROCEDURE — 85025 COMPLETE CBC W/AUTO DIFF WBC: CPT

## 2024-03-29 PROCEDURE — 83880 ASSAY OF NATRIURETIC PEPTIDE: CPT

## 2024-03-29 PROCEDURE — 87426 SARSCOV CORONAVIRUS AG IA: CPT

## 2024-03-29 RX ADMIN — ALBUTEROL SULFATE STA MG: 2.5 SOLUTION RESPIRATORY (INHALATION) at 14:45

## 2024-03-29 RX ADMIN — SODIUM CHLORIDE ONE ML: 900 INJECTION, SOLUTION INTRAVENOUS at 15:53

## 2024-03-29 RX ADMIN — METHYLPREDNISOLONE SODIUM SUCCINATE STA MG: 125 INJECTION, POWDER, FOR SOLUTION INTRAMUSCULAR; INTRAVENOUS at 15:53

## 2024-03-29 RX ADMIN — IPRATROPIUM BROMIDE STA MG: 0.5 SOLUTION RESPIRATORY (INHALATION) at 14:45

## 2024-03-29 RX ADMIN — ASPIRIN ONE MG: 81 TABLET, CHEWABLE ORAL at 15:59

## 2024-03-29 RX ADMIN — LEVOFLOXACIN ONE MLS/HR: 5 INJECTION, SOLUTION INTRAVENOUS at 15:53

## 2024-05-23 ENCOUNTER — HOSPITAL ENCOUNTER (EMERGENCY)
Dept: HOSPITAL 87 - ER | Age: 64
Discharge: HOME | End: 2024-05-23
Payer: COMMERCIAL

## 2024-05-23 VITALS — HEART RATE: 86 BPM | RESPIRATION RATE: 18 BRPM | OXYGEN SATURATION: 94 %

## 2024-05-23 VITALS — HEART RATE: 100 BPM | DIASTOLIC BLOOD PRESSURE: 85 MMHG | SYSTOLIC BLOOD PRESSURE: 130 MMHG | RESPIRATION RATE: 20 BRPM

## 2024-05-23 VITALS — WEIGHT: 130.07 LBS | BODY MASS INDEX: 20.9 KG/M2 | HEIGHT: 66 IN

## 2024-05-23 VITALS — TEMPERATURE: 98.7 F

## 2024-05-23 DIAGNOSIS — Z79.899: ICD-10-CM

## 2024-05-23 DIAGNOSIS — J44.1: Primary | ICD-10-CM

## 2024-05-23 DIAGNOSIS — F17.200: ICD-10-CM

## 2024-05-23 LAB
BASOPHILS NFR BLD AUTO: 0.7 % (ref 0–2)
BUN SERPL-MCNC: 9 MG/DL (ref 9–23)
CALCIUM SERPL-MCNC: 9.9 MG/DL (ref 8.7–10.4)
CARDIAC TROPONIN I PNL SERPL HS: < 4 NG/L (ref 3–34)
CHLORIDE SERPL-SCNC: 111 MEQ/L (ref 98–107)
CO2 SERPL-SCNC: 22 MEQ/L (ref 21–32)
CREAT SERPL-MCNC: 0.6 MG/DL (ref 0.6–1)
EOSINOPHIL NFR BLD AUTO: 0.6 % (ref 0–5)
ERYTHROCYTE [DISTWIDTH] IN BLOOD BY AUTOMATED COUNT: 18.5 % (ref 11.6–14.6)
GLUCOSE SERPL-MCNC: 98 MG/DL (ref 70–105)
HCT VFR BLD AUTO: 35.4 % (ref 36–48)
HGB BLD-MCNC: 10.9 G/DL (ref 12–16)
INR PPP: 1
LYMPHOCYTES NFR BLD AUTO: 9.9 % (ref 20–50)
MANUAL DIF COMMENT BLD-IMP: 0
MCH RBC QN AUTO: 24.3 PG (ref 28–32)
MCHC RBC AUTO-ENTMCNC: 30.8 G/DL (ref 31–37)
MCV RBC AUTO: 79.1 FL (ref 81–99)
MONOCYTES NFR BLD AUTO: 9.2 % (ref 2–8)
NEUTROPHILS NFR BLD AUTO: 79.6 % (ref 40–76)
PLATELET # BLD AUTO: 245 X1000/UL (ref 130–400)
PMV BLD AUTO: 8 FL (ref 7.4–10.4)
POTASSIUM SERPL-SCNC: 3.5 MEQ/L (ref 3.5–5.1)
PROTHROMBIN TIME: 11 SEC (ref 9.6–11)
RBC # BLD AUTO: 4.48 MILL/UL (ref 4.2–5.4)
SODIUM SERPL-SCNC: 140 MEQ/L (ref 136–145)
WBC # BLD: 4.1 X1000/UL (ref 4.5–11)

## 2024-05-23 PROCEDURE — 94640 AIRWAY INHALATION TREATMENT: CPT

## 2024-05-23 PROCEDURE — 80048 BASIC METABOLIC PNL TOTAL CA: CPT

## 2024-05-23 PROCEDURE — 99285 EMERGENCY DEPT VISIT HI MDM: CPT

## 2024-05-23 PROCEDURE — 85025 COMPLETE CBC W/AUTO DIFF WBC: CPT

## 2024-05-23 PROCEDURE — 36415 COLL VENOUS BLD VENIPUNCTURE: CPT

## 2024-05-23 PROCEDURE — 84484 ASSAY OF TROPONIN QUANT: CPT

## 2024-05-23 PROCEDURE — 83880 ASSAY OF NATRIURETIC PEPTIDE: CPT

## 2024-05-23 PROCEDURE — 71045 X-RAY EXAM CHEST 1 VIEW: CPT

## 2024-05-23 PROCEDURE — 93005 ELECTROCARDIOGRAM TRACING: CPT

## 2024-05-23 PROCEDURE — 85610 PROTHROMBIN TIME: CPT

## 2024-05-23 RX ADMIN — ALBUTEROL SULFATE ONE MG: 2.5 SOLUTION RESPIRATORY (INHALATION) at 15:03

## 2024-05-23 RX ADMIN — IPRATROPIUM BROMIDE AND ALBUTEROL SULFATE ONE ML: .5; 3 SOLUTION RESPIRATORY (INHALATION) at 15:03

## 2024-05-23 RX ADMIN — PREDNISONE STA MG: 20 TABLET ORAL at 15:28

## 2024-07-07 ENCOUNTER — HOSPITAL ENCOUNTER (EMERGENCY)
Dept: HOSPITAL 87 - ER | Age: 64
LOS: 1 days | Discharge: HOME | End: 2024-07-08
Payer: COMMERCIAL

## 2024-07-07 VITALS — OXYGEN SATURATION: 98 % | TEMPERATURE: 98.4 F

## 2024-07-07 VITALS — BODY MASS INDEX: 22.68 KG/M2 | HEIGHT: 66 IN | WEIGHT: 141.1 LBS

## 2024-07-07 DIAGNOSIS — R05.9: Primary | ICD-10-CM

## 2024-07-07 DIAGNOSIS — R10.9: ICD-10-CM

## 2024-07-07 DIAGNOSIS — J44.9: ICD-10-CM

## 2024-07-07 DIAGNOSIS — Z79.899: ICD-10-CM

## 2024-07-07 LAB
ALBUMIN SERPL BCP-MCNC: 4.4 G/DL (ref 3.2–4.8)
ALT SERPL W P-5'-P-CCNC: 11 IU/L (ref 10–49)
ANISOCYTOSIS BLD QL: (no result)
AST SERPL W P-5'-P-CCNC: 15 IU/L (ref ?–34)
BASOPHILS NFR BLD AUTO: 0.4 % (ref 0–2)
BILIRUB DIRECT SERPL-MCNC: < 0.1 MG/DL (ref ?–3)
BILIRUB SERPL-MCNC: < 0.2 MG/DL (ref 0.1–1)
BUN SERPL-MCNC: 13 MG/DL (ref 9–23)
CALCIUM SERPL-MCNC: 10 MG/DL (ref 8.7–10.4)
CHLORIDE SERPL-SCNC: 112 MEQ/L (ref 98–107)
CO2 SERPL-SCNC: 24 MEQ/L (ref 21–32)
CREAT SERPL-MCNC: 0.8 MG/DL (ref 0.6–1)
EOSINOPHIL NFR BLD AUTO: 1.8 % (ref 0–5)
ERYTHROCYTE [DISTWIDTH] IN BLOOD BY AUTOMATED COUNT: 22.3 % (ref 11.6–14.6)
GLUCOSE SERPL-MCNC: 112 MG/DL (ref 70–105)
HCT VFR BLD AUTO: 39.6 % (ref 36–48)
HGB BLD-MCNC: 12.3 G/DL (ref 12–16)
LYMPHOCYTES NFR BLD AUTO: 28.6 % (ref 20–50)
MANUAL DIF COMMENT BLD-IMP: 1
MCH RBC QN AUTO: 25 PG (ref 28–32)
MCHC RBC AUTO-ENTMCNC: 31.1 G/DL (ref 31–37)
MCV RBC AUTO: 80.5 FL (ref 81–99)
MONOCYTES NFR BLD AUTO: 7.2 % (ref 2–8)
NEUTROPHILS NFR BLD AUTO: 62 % (ref 40–76)
OVALOCYTES BLD QL SMEAR: (no result)
PLATELET # BLD AUTO: 330 X1000/UL (ref 130–400)
PLATELET # BLD EST: NORMAL 10*3/UL
PMV BLD AUTO: 7.2 FL (ref 7.4–10.4)
POTASSIUM SERPL-SCNC: 3.9 MEQ/L (ref 3.5–5.1)
PROT SERPL-MCNC: 6.6 G/DL (ref 6–8.3)
RBC # BLD AUTO: 4.92 MILL/UL (ref 4.2–5.4)
RBC MORPH BLD: YES
SODIUM SERPL-SCNC: 141 MEQ/L (ref 136–145)
WBC # BLD: 9.1 X1000/UL (ref 4.5–11)

## 2024-07-07 PROCEDURE — 71045 X-RAY EXAM CHEST 1 VIEW: CPT

## 2024-07-07 PROCEDURE — 80076 HEPATIC FUNCTION PANEL: CPT

## 2024-07-07 PROCEDURE — 83605 ASSAY OF LACTIC ACID: CPT

## 2024-07-07 PROCEDURE — 81003 URINALYSIS AUTO W/O SCOPE: CPT

## 2024-07-07 PROCEDURE — 80048 BASIC METABOLIC PNL TOTAL CA: CPT

## 2024-07-07 PROCEDURE — 36415 COLL VENOUS BLD VENIPUNCTURE: CPT

## 2024-07-07 PROCEDURE — 85025 COMPLETE CBC W/AUTO DIFF WBC: CPT

## 2024-07-07 PROCEDURE — 83690 ASSAY OF LIPASE: CPT

## 2024-07-07 PROCEDURE — 99284 EMERGENCY DEPT VISIT MOD MDM: CPT

## 2024-07-08 VITALS — RESPIRATION RATE: 16 BRPM | DIASTOLIC BLOOD PRESSURE: 82 MMHG | SYSTOLIC BLOOD PRESSURE: 125 MMHG | HEART RATE: 87 BPM

## 2024-07-08 LAB
APPEARANCE UR: CLEAR
COLOR UR: YELLOW
GLUCOSE UR STRIP.AUTO-MCNC: NEGATIVE MG/DL
HGB UR QL STRIP: NEGATIVE
KETONES UR STRIP-MCNC: NEGATIVE MG/DL
LEUKOCYTE ESTERASE UR QL STRIP: NEGATIVE
NITRITE UR QL STRIP: NEGATIVE
PH UR STRIP: 6.5 [PH] (ref 4.5–8)
PROT UR QL STRIP: NEGATIVE
SP GR UR STRIP: 1.01 (ref 1–1.03)
UROBILINOGEN UR STRIP-MCNC: 0.2 E.U./DL (ref 0.2–1)

## 2024-09-03 ENCOUNTER — HOSPITAL ENCOUNTER (INPATIENT)
Dept: HOSPITAL 87 - ER | Age: 64
LOS: 3 days | Discharge: HOME | DRG: 140 | End: 2024-09-06
Attending: INTERNAL MEDICINE | Admitting: INTERNAL MEDICINE
Payer: COMMERCIAL

## 2024-09-03 VITALS
OXYGEN SATURATION: 100 % | SYSTOLIC BLOOD PRESSURE: 97 MMHG | DIASTOLIC BLOOD PRESSURE: 55 MMHG | TEMPERATURE: 98.21 F | HEART RATE: 82 BPM | RESPIRATION RATE: 18 BRPM

## 2024-09-03 VITALS
RESPIRATION RATE: 18 BRPM | SYSTOLIC BLOOD PRESSURE: 92 MMHG | DIASTOLIC BLOOD PRESSURE: 44 MMHG | TEMPERATURE: 98 F | HEART RATE: 88 BPM | OXYGEN SATURATION: 99 %

## 2024-09-03 VITALS — WEIGHT: 134 LBS | BODY MASS INDEX: 21.03 KG/M2 | HEIGHT: 67 IN

## 2024-09-03 VITALS
RESPIRATION RATE: 20 BRPM | SYSTOLIC BLOOD PRESSURE: 106 MMHG | DIASTOLIC BLOOD PRESSURE: 69 MMHG | OXYGEN SATURATION: 93 % | TEMPERATURE: 97.21 F | HEART RATE: 75 BPM

## 2024-09-03 VITALS
HEART RATE: 88 BPM | DIASTOLIC BLOOD PRESSURE: 44 MMHG | TEMPERATURE: 98.05 F | RESPIRATION RATE: 18 BRPM | SYSTOLIC BLOOD PRESSURE: 92 MMHG

## 2024-09-03 VITALS — OXYGEN SATURATION: 95 % | HEART RATE: 77 BPM | RESPIRATION RATE: 18 BRPM

## 2024-09-03 VITALS — HEART RATE: 75 BPM | OXYGEN SATURATION: 96 % | RESPIRATION RATE: 18 BRPM

## 2024-09-03 DIAGNOSIS — J96.01: ICD-10-CM

## 2024-09-03 DIAGNOSIS — M54.9: ICD-10-CM

## 2024-09-03 DIAGNOSIS — Z20.822: ICD-10-CM

## 2024-09-03 DIAGNOSIS — Z82.49: ICD-10-CM

## 2024-09-03 DIAGNOSIS — J84.9: ICD-10-CM

## 2024-09-03 DIAGNOSIS — J44.1: Primary | ICD-10-CM

## 2024-09-03 DIAGNOSIS — J45.901: ICD-10-CM

## 2024-09-03 DIAGNOSIS — G62.9: ICD-10-CM

## 2024-09-03 DIAGNOSIS — G89.29: ICD-10-CM

## 2024-09-03 DIAGNOSIS — Z79.899: ICD-10-CM

## 2024-09-03 DIAGNOSIS — F17.210: ICD-10-CM

## 2024-09-03 LAB
BASOPHILS NFR BLD AUTO: 0.5 % (ref 0–2)
BUN SERPL-MCNC: 11 MG/DL (ref 9–23)
CALCIUM SERPL-MCNC: 9.6 MG/DL (ref 8.7–10.4)
CHLORIDE SERPL-SCNC: 109 MEQ/L (ref 98–107)
CO2 SERPL-SCNC: 30 MEQ/L (ref 21–32)
CREAT SERPL-MCNC: 0.7 MG/DL (ref 0.6–1)
EOSINOPHIL NFR BLD AUTO: 0.8 % (ref 0–5)
ERYTHROCYTE [DISTWIDTH] IN BLOOD BY AUTOMATED COUNT: 21.8 % (ref 11.6–14.6)
GLUCOSE SERPL-MCNC: 81 MG/DL (ref 70–105)
HCT VFR BLD AUTO: 37.9 % (ref 36–48)
HGB BLD-MCNC: 11.3 G/DL (ref 12–16)
LYMPHOCYTES NFR BLD AUTO: 44.6 % (ref 20–50)
MANUAL DIF COMMENT BLD-IMP: 0
MCH RBC QN AUTO: 24.5 PG (ref 28–32)
MCHC RBC AUTO-ENTMCNC: 29.8 G/DL (ref 31–37)
MCV RBC AUTO: 82.3 FL (ref 81–99)
MONOCYTES NFR BLD AUTO: 11.2 % (ref 2–8)
NEUTROPHILS NFR BLD AUTO: 42.9 % (ref 40–76)
PLATELET # BLD AUTO: 282 X1000/UL (ref 130–400)
PMV BLD AUTO: 8.1 FL (ref 7.4–10.4)
POTASSIUM SERPL-SCNC: 3.6 MEQ/L (ref 3.5–5.1)
RBC # BLD AUTO: 4.61 MILL/UL (ref 4.2–5.4)
SODIUM SERPL-SCNC: 143 MEQ/L (ref 136–145)
WBC # BLD: 4.3 X1000/UL (ref 4.5–11)

## 2024-09-03 PROCEDURE — 94618 PULMONARY STRESS TESTING: CPT

## 2024-09-03 PROCEDURE — 82375 ASSAY CARBOXYHB QUANT: CPT

## 2024-09-03 PROCEDURE — 82805 BLOOD GASES W/O2 SATURATION: CPT

## 2024-09-03 PROCEDURE — 83880 ASSAY OF NATRIURETIC PEPTIDE: CPT

## 2024-09-03 PROCEDURE — 93005 ELECTROCARDIOGRAM TRACING: CPT

## 2024-09-03 PROCEDURE — 87426 SARSCOV CORONAVIRUS AG IA: CPT

## 2024-09-03 PROCEDURE — 36600 WITHDRAWAL OF ARTERIAL BLOOD: CPT

## 2024-09-03 PROCEDURE — 85025 COMPLETE CBC W/AUTO DIFF WBC: CPT

## 2024-09-03 PROCEDURE — 71045 X-RAY EXAM CHEST 1 VIEW: CPT

## 2024-09-03 PROCEDURE — 36415 COLL VENOUS BLD VENIPUNCTURE: CPT

## 2024-09-03 PROCEDURE — 94640 AIRWAY INHALATION TREATMENT: CPT

## 2024-09-03 PROCEDURE — 80048 BASIC METABOLIC PNL TOTAL CA: CPT

## 2024-09-03 PROCEDURE — 99285 EMERGENCY DEPT VISIT HI MDM: CPT

## 2024-09-03 RX ADMIN — IPRATROPIUM BROMIDE AND ALBUTEROL SULFATE ONE ML: .5; 3 SOLUTION RESPIRATORY (INHALATION) at 03:59

## 2024-09-03 RX ADMIN — PANTOPRAZOLE SODIUM SCH MG: 40 INJECTION, POWDER, FOR SOLUTION INTRAVENOUS at 21:25

## 2024-09-03 RX ADMIN — Medication SCH MG: at 12:48

## 2024-09-03 RX ADMIN — FAMOTIDINE SCH MG: 20 TABLET ORAL at 13:02

## 2024-09-03 RX ADMIN — IPRATROPIUM BROMIDE AND ALBUTEROL SULFATE SCH ML: .5; 3 SOLUTION RESPIRATORY (INHALATION) at 20:00

## 2024-09-03 RX ADMIN — ENOXAPARIN SODIUM SCH MG: 100 INJECTION SUBCUTANEOUS at 12:53

## 2024-09-03 RX ADMIN — ALBUTEROL SULFATE ONE MG: 2.5 SOLUTION RESPIRATORY (INHALATION) at 04:00

## 2024-09-03 RX ADMIN — METHYLPREDNISOLONE SODIUM SUCCINATE ONE MG: 125 INJECTION, POWDER, FOR SOLUTION INTRAMUSCULAR; INTRAVENOUS at 03:03

## 2024-09-03 RX ADMIN — LEVOFLOXACIN SCH MG: 500 TABLET, FILM COATED ORAL at 21:57

## 2024-09-03 RX ADMIN — GABAPENTIN SCH MG: 300 CAPSULE ORAL at 14:29

## 2024-09-04 VITALS
DIASTOLIC BLOOD PRESSURE: 63 MMHG | SYSTOLIC BLOOD PRESSURE: 102 MMHG | OXYGEN SATURATION: 95 % | RESPIRATION RATE: 20 BRPM | HEART RATE: 83 BPM | TEMPERATURE: 97.8 F

## 2024-09-04 VITALS — RESPIRATION RATE: 16 BRPM | OXYGEN SATURATION: 99 % | HEART RATE: 72 BPM

## 2024-09-04 VITALS
TEMPERATURE: 97.41 F | OXYGEN SATURATION: 93 % | HEART RATE: 83 BPM | SYSTOLIC BLOOD PRESSURE: 105 MMHG | DIASTOLIC BLOOD PRESSURE: 61 MMHG | RESPIRATION RATE: 20 BRPM

## 2024-09-04 VITALS
OXYGEN SATURATION: 92 % | DIASTOLIC BLOOD PRESSURE: 72 MMHG | TEMPERATURE: 97.71 F | RESPIRATION RATE: 18 BRPM | SYSTOLIC BLOOD PRESSURE: 109 MMHG | HEART RATE: 77 BPM

## 2024-09-04 VITALS — RESPIRATION RATE: 16 BRPM | HEART RATE: 80 BPM | OXYGEN SATURATION: 80 %

## 2024-09-04 VITALS — RESPIRATION RATE: 18 BRPM | OXYGEN SATURATION: 96 % | HEART RATE: 71 BPM

## 2024-09-04 VITALS — OXYGEN SATURATION: 99 % | HEART RATE: 77 BPM | RESPIRATION RATE: 16 BRPM

## 2024-09-04 VITALS
SYSTOLIC BLOOD PRESSURE: 143 MMHG | HEART RATE: 99 BPM | OXYGEN SATURATION: 96 % | RESPIRATION RATE: 20 BRPM | TEMPERATURE: 97.41 F | DIASTOLIC BLOOD PRESSURE: 85 MMHG

## 2024-09-04 VITALS — OXYGEN SATURATION: 95 % | HEART RATE: 70 BPM | RESPIRATION RATE: 18 BRPM

## 2024-09-04 RX ADMIN — GUAIFENESIN AND DEXTROMETHORPHAN PRN ML: 100; 10 SYRUP ORAL at 21:31

## 2024-09-05 VITALS — HEART RATE: 71 BPM | RESPIRATION RATE: 18 BRPM

## 2024-09-05 VITALS
RESPIRATION RATE: 19 BRPM | DIASTOLIC BLOOD PRESSURE: 79 MMHG | HEART RATE: 75 BPM | OXYGEN SATURATION: 93 % | TEMPERATURE: 97.51 F | SYSTOLIC BLOOD PRESSURE: 136 MMHG

## 2024-09-05 VITALS — RESPIRATION RATE: 16 BRPM | HEART RATE: 66 BPM

## 2024-09-05 VITALS — OXYGEN SATURATION: 78 % | RESPIRATION RATE: 16 BRPM | HEART RATE: 76 BPM

## 2024-09-05 VITALS — HEART RATE: 75 BPM | RESPIRATION RATE: 18 BRPM

## 2024-09-05 VITALS
HEART RATE: 74 BPM | OXYGEN SATURATION: 96 % | DIASTOLIC BLOOD PRESSURE: 77 MMHG | RESPIRATION RATE: 20 BRPM | SYSTOLIC BLOOD PRESSURE: 128 MMHG | TEMPERATURE: 98.1 F

## 2024-09-05 VITALS
OXYGEN SATURATION: 95 % | DIASTOLIC BLOOD PRESSURE: 58 MMHG | RESPIRATION RATE: 20 BRPM | HEART RATE: 83 BPM | TEMPERATURE: 97.11 F | SYSTOLIC BLOOD PRESSURE: 114 MMHG

## 2024-09-05 VITALS
RESPIRATION RATE: 20 BRPM | TEMPERATURE: 97.9 F | DIASTOLIC BLOOD PRESSURE: 74 MMHG | HEART RATE: 66 BPM | OXYGEN SATURATION: 96 % | SYSTOLIC BLOOD PRESSURE: 126 MMHG

## 2024-09-05 VITALS
TEMPERATURE: 98.6 F | DIASTOLIC BLOOD PRESSURE: 78 MMHG | HEART RATE: 68 BPM | RESPIRATION RATE: 19 BRPM | SYSTOLIC BLOOD PRESSURE: 137 MMHG | OXYGEN SATURATION: 93 %

## 2024-09-05 VITALS
OXYGEN SATURATION: 99 % | DIASTOLIC BLOOD PRESSURE: 80 MMHG | SYSTOLIC BLOOD PRESSURE: 129 MMHG | HEART RATE: 76 BPM | TEMPERATURE: 96.21 F | RESPIRATION RATE: 20 BRPM

## 2024-09-05 LAB
BASE EXCESS BLDA CALC-SCNC: 2.4 MMOL/L (ref -2–3)
COHGB MFR BLDA: 0.7 % (ref 0.5–1.5)
DO-HGB MFR BLDA: 6.7 % (ref 0–5)
HCO3 BLDA-SCNC: 25.8 MMOL/L (ref 21–28)
HGB BLDA OXIMETRY-MCNC: 11 G/DL (ref 12–16)
INHALED O2 CONCENTRATION: 21 %
METHGB MFR BLDA: 0.3 % (ref 0.5–1.5)
OXYHGB MFR BLDA: 92.3 % (ref 94–98)
PCO2 TEMP ADJ BLDA: 35.3 MMHG (ref 32–45)
PH TEMP ADJ BLDA: 7.48 [PH] (ref 7.35–7.45)
PO2 TEMP ADJ BLDA: 67.9 MMHG (ref 83–108)
SAO2 % BLDA: 93.2 % (ref 94–98)
SPECIMEN DRAWN FROM PATIENT: (no result)
VENTILATION MODE VENT: (no result)

## 2024-09-05 RX ADMIN — PREDNISONE SCH MG: 20 TABLET ORAL at 09:01

## 2024-09-06 VITALS
DIASTOLIC BLOOD PRESSURE: 72 MMHG | RESPIRATION RATE: 18 BRPM | TEMPERATURE: 97.8 F | SYSTOLIC BLOOD PRESSURE: 120 MMHG | HEART RATE: 76 BPM | OXYGEN SATURATION: 96 %

## 2024-09-06 VITALS
SYSTOLIC BLOOD PRESSURE: 112 MMHG | TEMPERATURE: 98.21 F | DIASTOLIC BLOOD PRESSURE: 66 MMHG | RESPIRATION RATE: 18 BRPM | HEART RATE: 74 BPM | OXYGEN SATURATION: 98 %

## 2024-09-06 VITALS
DIASTOLIC BLOOD PRESSURE: 78 MMHG | OXYGEN SATURATION: 98 % | TEMPERATURE: 98 F | HEART RATE: 74 BPM | SYSTOLIC BLOOD PRESSURE: 122 MMHG

## 2024-09-06 RX ADMIN — PANTOPRAZOLE SODIUM SCH MG: 40 TABLET, DELAYED RELEASE ORAL at 08:53

## 2025-06-20 ENCOUNTER — HOSPITAL ENCOUNTER (EMERGENCY)
Dept: HOSPITAL 87 - ER | Age: 65
Discharge: LEFT BEFORE DISCHARGE | End: 2025-06-20
Payer: COMMERCIAL

## 2025-06-20 VITALS — WEIGHT: 114.64 LBS | BODY MASS INDEX: 19.1 KG/M2 | HEIGHT: 65 IN

## 2025-06-20 VITALS
RESPIRATION RATE: 16 BRPM | DIASTOLIC BLOOD PRESSURE: 62 MMHG | HEART RATE: 67 BPM | OXYGEN SATURATION: 97 % | SYSTOLIC BLOOD PRESSURE: 94 MMHG | TEMPERATURE: 98.24 F

## 2025-06-20 DIAGNOSIS — F32.A: ICD-10-CM

## 2025-06-20 DIAGNOSIS — Z79.899: ICD-10-CM

## 2025-06-20 DIAGNOSIS — Z79.52: ICD-10-CM

## 2025-06-20 DIAGNOSIS — R11.2: Primary | ICD-10-CM

## 2025-06-20 DIAGNOSIS — Z90.710: ICD-10-CM

## 2025-06-20 DIAGNOSIS — F41.9: ICD-10-CM

## 2025-06-20 LAB
ALBUMIN SERPL BCP-MCNC: 4.8 G/DL (ref 3.2–4.8)
ALT SERPL W P-5'-P-CCNC: 13 IU/L (ref 10–49)
AST SERPL W P-5'-P-CCNC: 17 IU/L (ref ?–34)
BASOPHILS NFR BLD AUTO: 0.5 % (ref 0–2)
BILIRUB DIRECT SERPL-MCNC: < 0.1 MG/DL (ref ?–3)
BILIRUB SERPL-MCNC: 0.2 MG/DL (ref 0.1–1)
BUN SERPL-MCNC: 16 MG/DL (ref 9–23)
CALCIUM SERPL-MCNC: 10.5 MG/DL (ref 8.7–10.4)
CARDIAC TROPONIN I PNL SERPL HS: < 4 NG/L (ref 3–34)
CHLORIDE SERPL-SCNC: 109 MEQ/L (ref 98–107)
CO2 SERPL-SCNC: 23 MEQ/L (ref 21–32)
CREAT SERPL-MCNC: 0.8 MG/DL (ref 0.6–1)
EOSINOPHIL NFR BLD AUTO: 1.8 % (ref 0–5)
ERYTHROCYTE [DISTWIDTH] IN BLOOD BY AUTOMATED COUNT: 17.7 % (ref 11.6–14.6)
GLUCOSE SERPL-MCNC: 84 MG/DL (ref 70–105)
HCT VFR BLD AUTO: 40.2 % (ref 36–48)
HEMOLYSIS INTERF INDEX SERPL-ACNC: (no result) (ref 1–3)
HGB BLD-MCNC: 12.8 G/DL (ref 12–16)
ICTERIC INTERF INDEX SERPL-ACNC: (no result) (ref 1–4)
INR PPP: 1
LIPEMIC INTERF INDEX SERPL-ACNC: (no result) (ref 1–3)
LYMPHOCYTES NFR BLD AUTO: 37.3 % (ref 20–50)
MANUAL DIF COMMENT BLD-IMP: (no result)
MCH RBC QN AUTO: 27.6 PG (ref 28–32)
MCHC RBC AUTO-ENTMCNC: 31.9 G/DL (ref 31–37)
MCV RBC AUTO: 86.4 FL (ref 81–99)
MONOCYTES NFR BLD AUTO: 7.3 % (ref 2–8)
NEUTROPHILS NFR BLD AUTO: 53.1 % (ref 40–76)
PLATELET # BLD AUTO: 230 X1000/UL (ref 130–400)
PMV BLD AUTO: 7.5 FL (ref 7.4–10.4)
POTASSIUM SERPL-SCNC: 4 MEQ/L (ref 3.5–5.1)
PROT SERPL-MCNC: 6.8 G/DL (ref 6–8.3)
PROTHROMBIN TIME: 11 SEC (ref 9.6–11)
RBC # BLD AUTO: 4.65 MILL/UL (ref 4.2–5.4)
RBC MORPH BLD: (no result)
SODIUM SERPL-SCNC: 141 MEQ/L (ref 136–145)
WBC # BLD: 4.5 X1000/UL (ref 4.5–11)

## 2025-06-20 PROCEDURE — 80076 HEPATIC FUNCTION PANEL: CPT

## 2025-06-20 PROCEDURE — 85025 COMPLETE CBC W/AUTO DIFF WBC: CPT

## 2025-06-20 PROCEDURE — 93005 ELECTROCARDIOGRAM TRACING: CPT

## 2025-06-20 PROCEDURE — 84484 ASSAY OF TROPONIN QUANT: CPT

## 2025-06-20 PROCEDURE — A4606 OXYGEN PROBE USED W OXIMETER: HCPCS

## 2025-06-20 PROCEDURE — 80048 BASIC METABOLIC PNL TOTAL CA: CPT

## 2025-06-20 PROCEDURE — 99285 EMERGENCY DEPT VISIT HI MDM: CPT

## 2025-06-20 PROCEDURE — 85610 PROTHROMBIN TIME: CPT

## 2025-06-20 PROCEDURE — 71045 X-RAY EXAM CHEST 1 VIEW: CPT

## 2025-06-20 PROCEDURE — 76705 ECHO EXAM OF ABDOMEN: CPT

## 2025-06-20 PROCEDURE — 74176 CT ABD & PELVIS W/O CONTRAST: CPT

## 2025-06-20 PROCEDURE — 83690 ASSAY OF LIPASE: CPT

## 2025-06-20 PROCEDURE — 36415 COLL VENOUS BLD VENIPUNCTURE: CPT

## 2025-06-20 RX ADMIN — ALUMINUM HYDROXIDE, MAGNESIUM HYDROXIDE, AND SIMETHICONE STA ML: 200; 200; 20 SUSPENSION ORAL at 17:27

## 2025-06-20 RX ADMIN — ONDANSETRON STA MG: 4 TABLET, ORALLY DISINTEGRATING ORAL at 17:27

## 2025-07-03 ENCOUNTER — HOSPITAL ENCOUNTER (INPATIENT)
Dept: HOSPITAL 87 - ER | Age: 65
LOS: 2 days | Discharge: HOME | DRG: 284 | End: 2025-07-05
Attending: INTERNAL MEDICINE | Admitting: INTERNAL MEDICINE
Payer: COMMERCIAL

## 2025-07-03 VITALS — HEIGHT: 66 IN | WEIGHT: 123 LBS | BODY MASS INDEX: 19.77 KG/M2

## 2025-07-03 DIAGNOSIS — K82.8: Primary | ICD-10-CM

## 2025-07-03 DIAGNOSIS — I10: ICD-10-CM

## 2025-07-03 DIAGNOSIS — Z82.49: ICD-10-CM

## 2025-07-03 DIAGNOSIS — F32.A: ICD-10-CM

## 2025-07-03 DIAGNOSIS — F17.210: ICD-10-CM

## 2025-07-03 DIAGNOSIS — F41.9: ICD-10-CM

## 2025-07-03 DIAGNOSIS — Z59.01: ICD-10-CM

## 2025-07-03 PROCEDURE — 76705 ECHO EXAM OF ABDOMEN: CPT

## 2025-07-03 PROCEDURE — 80048 BASIC METABOLIC PNL TOTAL CA: CPT

## 2025-07-03 PROCEDURE — 36415 COLL VENOUS BLD VENIPUNCTURE: CPT

## 2025-07-03 PROCEDURE — 81003 URINALYSIS AUTO W/O SCOPE: CPT

## 2025-07-03 PROCEDURE — 85025 COMPLETE CBC W/AUTO DIFF WBC: CPT

## 2025-07-03 PROCEDURE — 99285 EMERGENCY DEPT VISIT HI MDM: CPT

## 2025-07-03 PROCEDURE — 80076 HEPATIC FUNCTION PANEL: CPT

## 2025-07-03 SDOH — ECONOMIC STABILITY - HOUSING INSECURITY: SHELTERED HOMELESSNESS: Z59.01

## 2025-07-04 VITALS
SYSTOLIC BLOOD PRESSURE: 90 MMHG | RESPIRATION RATE: 16 BRPM | DIASTOLIC BLOOD PRESSURE: 57 MMHG | TEMPERATURE: 97.4 F | OXYGEN SATURATION: 96 % | HEART RATE: 56 BPM

## 2025-07-04 VITALS
RESPIRATION RATE: 16 BRPM | OXYGEN SATURATION: 98 % | SYSTOLIC BLOOD PRESSURE: 95 MMHG | HEART RATE: 64 BPM | TEMPERATURE: 98.4 F | DIASTOLIC BLOOD PRESSURE: 54 MMHG

## 2025-07-04 VITALS
SYSTOLIC BLOOD PRESSURE: 93 MMHG | DIASTOLIC BLOOD PRESSURE: 58 MMHG | TEMPERATURE: 97.4 F | RESPIRATION RATE: 16 BRPM | OXYGEN SATURATION: 95 % | HEART RATE: 56 BPM

## 2025-07-04 VITALS
TEMPERATURE: 98.2 F | DIASTOLIC BLOOD PRESSURE: 72 MMHG | RESPIRATION RATE: 16 BRPM | SYSTOLIC BLOOD PRESSURE: 123 MMHG | HEART RATE: 69 BPM

## 2025-07-04 VITALS
OXYGEN SATURATION: 97 % | SYSTOLIC BLOOD PRESSURE: 123 MMHG | DIASTOLIC BLOOD PRESSURE: 72 MMHG | HEART RATE: 58 BPM | TEMPERATURE: 98.2 F | RESPIRATION RATE: 18 BRPM

## 2025-07-04 LAB
ALBUMIN SERPL BCP-MCNC: 4.1 G/DL (ref 3.2–4.8)
ALT SERPL W P-5'-P-CCNC: 10 IU/L (ref 10–49)
AMORPH SED URNS QL MICRO: (no result) /LPF
APPEARANCE UR: CLEAR
AST SERPL W P-5'-P-CCNC: 16 IU/L (ref ?–34)
BACTERIA #/AREA URNS AUTO: (no result) /[HPF]
BASOPHILS NFR BLD AUTO: 0.5 % (ref 0–2)
BILIRUB DIRECT SERPL-MCNC: < 0.1 MG/DL (ref ?–3)
BILIRUB SERPL-MCNC: < 0.2 MG/DL (ref 0.1–1)
BUN SERPL-MCNC: 22 MG/DL (ref 9–23)
CA PHOS CRY #/AREA UR COMP ASSIST: (no result) /LPF
CALCIUM SERPL-MCNC: 11 MG/DL (ref 8.7–10.4)
CHLORIDE SERPL-SCNC: 108 MEQ/L (ref 98–107)
CO2 SERPL-SCNC: 26 MEQ/L (ref 21–32)
COARSE GRAN CASTS #/AREA URNS AUTO: (no result) /LPF
COLOR UR: YELLOW
CREAT SERPL-MCNC: 0.9 MG/DL (ref 0.6–1)
CRYSTALS UR QL AUTO: (no result) /LPF
EOSINOPHIL NFR BLD AUTO: 0.1 % (ref 0–5)
ERYTHROCYTE [DISTWIDTH] IN BLOOD BY AUTOMATED COUNT: 18.1 % (ref 11.6–14.6)
FINE GRAN CASTS #/AREA URNS AUTO: (no result) /LPF
GLUCOSE SERPL-MCNC: 82 MG/DL (ref 70–105)
GLUCOSE UR STRIP.AUTO-MCNC: NEGATIVE MG/DL
HCT VFR BLD AUTO: 38.2 % (ref 36–48)
HEMOLYSIS INTERF INDEX SERPL-ACNC: (no result) (ref 1–3)
HGB BLD-MCNC: 11.9 G/DL (ref 12–16)
HGB UR QL STRIP: (no result)
HYALINE CASTS #/AREA URNS AUTO: (no result) /LPF
ICTERIC INTERF INDEX SERPL-ACNC: (no result) (ref 1–4)
KETONES UR STRIP-MCNC: (no result) MG/DL
LEUKOCYTE ESTERASE UR QL STRIP: NEGATIVE
LIPEMIC INTERF INDEX SERPL-ACNC: (no result) (ref 1–3)
LYMPHOCYTES NFR BLD AUTO: 30.5 % (ref 20–50)
MANUAL DIF COMMENT BLD-IMP: (no result)
MCH RBC QN AUTO: 26.7 PG (ref 28–32)
MCHC RBC AUTO-ENTMCNC: 31.1 G/DL (ref 31–37)
MCV RBC AUTO: 85.8 FL (ref 81–99)
MONOCYTES NFR BLD AUTO: 9.9 % (ref 2–8)
MUCOUS THREADS UR QL AUTO: (no result) /LPF
NEUTROPHILS NFR BLD AUTO: 59 % (ref 40–76)
NITRITE UR QL STRIP: NEGATIVE
PH UR STRIP: 5.5 [PH] (ref 4.5–8)
PLATELET # BLD AUTO: 219 X1000/UL (ref 130–400)
PMV BLD AUTO: 7.6 FL (ref 7.4–10.4)
POTASSIUM SERPL-SCNC: 3.4 MEQ/L (ref 3.5–5.1)
PROT SERPL-MCNC: 5.9 G/DL (ref 6–8.3)
PROT UR QL STRIP: NEGATIVE
RBC # BLD AUTO: 4.45 MILL/UL (ref 4.2–5.4)
RBC #/AREA URNS AUTO: (no result) /HPF (ref 0–2)
RBC MORPH BLD: (no result)
RENAL EPI CELLS URNS QL MICRO: (no result) /LPF
SODIUM SERPL-SCNC: 142 MEQ/L (ref 136–145)
SP GR UR STRIP: 1.03 (ref 1–1.03)
SQUAMOUS #/AREA URNS AUTO: (no result) /LPF
TRICHOMONAS #/AREA UR COMP ASSIST: (no result) /[HPF]
UROBILINOGEN UR STRIP-MCNC: 1 E.U./DL (ref 0.2–1)
WAXY CASTS #/AREA UR COMP ASSIST: (no result) /LPF
WBC # BLD: 6.2 X1000/UL (ref 4.5–11)
WBC #/AREA URNS AUTO: (no result) /HPF (ref 0–2)
WBC CASTS #/AREA URNS LPF: (no result) /LPF
YEAST UR QL AUTO: (no result)

## 2025-07-04 RX ADMIN — PROCHLORPERAZINE EDISYLATE PRN MG: 5 INJECTION INTRAMUSCULAR; INTRAVENOUS at 20:35

## 2025-07-04 RX ADMIN — Medication PRN MG: at 13:39

## 2025-07-04 RX ADMIN — ONDANSETRON STA MG: 4 TABLET, ORALLY DISINTEGRATING ORAL at 00:49

## 2025-07-04 RX ADMIN — POTASSIUM CHLORIDE NR MEQ: 1.5 POWDER, FOR SOLUTION ORAL at 09:40

## 2025-07-04 RX ADMIN — GABAPENTIN SCH MG: 300 CAPSULE ORAL at 09:39

## 2025-07-04 RX ADMIN — DEXTROSE ONE MLS/HR: 5 SOLUTION INTRAVENOUS at 04:43

## 2025-07-04 RX ADMIN — AMLODIPINE BESYLATE SCH MG: 5 TABLET ORAL at 09:39

## 2025-07-04 RX ADMIN — PANTOPRAZOLE SODIUM SCH MG: 40 INJECTION, POWDER, FOR SOLUTION INTRAVENOUS at 09:39

## 2025-07-04 RX ADMIN — METHOCARBAMOL SCH MG: 750 TABLET ORAL at 09:39

## 2025-07-05 VITALS
TEMPERATURE: 97.7 F | RESPIRATION RATE: 16 BRPM | HEART RATE: 62 BPM | OXYGEN SATURATION: 96 % | SYSTOLIC BLOOD PRESSURE: 95 MMHG | DIASTOLIC BLOOD PRESSURE: 60 MMHG

## 2025-07-05 VITALS
HEART RATE: 60 BPM | SYSTOLIC BLOOD PRESSURE: 95 MMHG | TEMPERATURE: 98.3 F | OXYGEN SATURATION: 97 % | DIASTOLIC BLOOD PRESSURE: 60 MMHG

## 2025-07-05 VITALS
DIASTOLIC BLOOD PRESSURE: 60 MMHG | RESPIRATION RATE: 18 BRPM | TEMPERATURE: 97.7 F | SYSTOLIC BLOOD PRESSURE: 97 MMHG | HEART RATE: 50 BPM | OXYGEN SATURATION: 98 %

## 2025-07-05 VITALS
RESPIRATION RATE: 16 BRPM | TEMPERATURE: 97.88 F | HEART RATE: 53 BPM | OXYGEN SATURATION: 95 % | SYSTOLIC BLOOD PRESSURE: 108 MMHG | DIASTOLIC BLOOD PRESSURE: 67 MMHG

## 2025-07-05 VITALS
TEMPERATURE: 98.24 F | RESPIRATION RATE: 18 BRPM | SYSTOLIC BLOOD PRESSURE: 95 MMHG | DIASTOLIC BLOOD PRESSURE: 60 MMHG | HEART RATE: 60 BPM | OXYGEN SATURATION: 97 %

## 2025-07-05 VITALS — HEART RATE: 59 BPM

## 2025-07-05 LAB
BUN SERPL-MCNC: 13 MG/DL (ref 9–23)
CALCIUM SERPL-MCNC: 9.5 MG/DL (ref 8.7–10.4)
CHLORIDE SERPL-SCNC: 111 MEQ/L (ref 98–107)
CO2 SERPL-SCNC: 22 MEQ/L (ref 21–32)
CREAT SERPL-MCNC: 0.7 MG/DL (ref 0.6–1)
GLUCOSE SERPL-MCNC: 60 MG/DL (ref 70–105)
POTASSIUM SERPL-SCNC: 4.5 MEQ/L (ref 3.5–5.1)
SODIUM SERPL-SCNC: 144 MEQ/L (ref 136–145)